# Patient Record
Sex: FEMALE | Race: WHITE | NOT HISPANIC OR LATINO | URBAN - NONMETROPOLITAN AREA
[De-identification: names, ages, dates, MRNs, and addresses within clinical notes are randomized per-mention and may not be internally consistent; named-entity substitution may affect disease eponyms.]

---

## 2019-09-04 ENCOUNTER — IMPORTED ENCOUNTER (OUTPATIENT)
Dept: URBAN - NONMETROPOLITAN AREA CLINIC 1 | Facility: CLINIC | Age: 64
End: 2019-09-04

## 2019-09-04 PROBLEM — H25.13: Noted: 2019-09-04

## 2019-09-04 PROBLEM — H52.13: Noted: 2019-09-04

## 2019-09-04 PROCEDURE — S0620 ROUTINE OPHTHALMOLOGICAL EXA: HCPCS

## 2021-07-05 ENCOUNTER — IMPORTED ENCOUNTER (OUTPATIENT)
Dept: URBAN - NONMETROPOLITAN AREA CLINIC 1 | Facility: CLINIC | Age: 66
End: 2021-07-05

## 2021-07-05 PROBLEM — H25.813: Noted: 2021-07-05

## 2021-07-05 PROBLEM — H52.13: Noted: 2021-07-05

## 2021-07-05 PROBLEM — H43.21: Noted: 2021-07-05

## 2021-07-05 PROCEDURE — 92015 DETERMINE REFRACTIVE STATE: CPT

## 2021-07-05 PROCEDURE — 92014 COMPRE OPH EXAM EST PT 1/>: CPT

## 2021-07-05 NOTE — PATIENT DISCUSSION
Cataract OU -Reviewed symptoms of advancing cataract growth such as glare and halos and decreased vision.-Continue to monitor for now. Pt will notify us if any new symptoms develop. FloatersDiscussed  signs/symptoms of RD or tear. Discussed in detail the nature of flashes/floaters and to call if there is a sudden increase in their number. Myopia-Discussed diagnosis with patient. -Explained that people who are myopic are at a higher risk for developing RD/RT and reviewed associated S&S.-Pt to contact our office if symptoms develop. Updated spec Rx given. Recommend lens that will provide comfort as well as protect safety and health of eyes. -will call back for computer glasses

## 2021-07-20 ENCOUNTER — IMPORTED ENCOUNTER (OUTPATIENT)
Dept: URBAN - NONMETROPOLITAN AREA CLINIC 1 | Facility: CLINIC | Age: 66
End: 2021-07-20

## 2021-07-20 NOTE — PATIENT DISCUSSION
Myopia/Astigmatism/Presbyopia-Rx adjusted both in cylinder and sphere to see if ghosting improves-If NI consider cataract surgical evaluationCataract OU -Reviewed symptoms of advancing cataract growth such as glare and halos and decreased vision.-Continue to monitor for now. Pt will notify us if any new symptoms develop. FloatersDiscussed  signs/symptoms of RD or tear. Discussed in detail the nature of flashes/floaters and to call if there is a sudden increase in their number.

## 2022-04-09 ASSESSMENT — VISUAL ACUITY
OD_SC: 20/20
OS_SC: 20/25
OU_CC: 20/20
OS_SC: 20/25
OD_SC: 20/30
OS_SC: 20/30
OD_CC: J1
OS_CC: J1
OD_SC: 20/30-2

## 2022-04-09 ASSESSMENT — TONOMETRY
OD_IOP_MMHG: 16
OD_IOP_MMHG: 16
OS_IOP_MMHG: 16
OS_IOP_MMHG: 16

## 2024-03-18 ENCOUNTER — ESTABLISHED PATIENT (OUTPATIENT)
Dept: RURAL CLINIC 2 | Facility: CLINIC | Age: 69
End: 2024-03-18

## 2024-03-18 DIAGNOSIS — H43.21: ICD-10-CM

## 2024-03-18 DIAGNOSIS — H25.813: ICD-10-CM

## 2024-03-18 DIAGNOSIS — H10.023: ICD-10-CM

## 2024-03-18 PROCEDURE — 99214 OFFICE O/P EST MOD 30 MIN: CPT

## 2024-03-18 ASSESSMENT — VISUAL ACUITY
OD_CC: 20/50
OS_CC: 20/30

## 2024-03-18 ASSESSMENT — TONOMETRY
OS_IOP_MMHG: 16
OD_IOP_MMHG: 16

## 2024-04-08 ENCOUNTER — CONSULTATION/EVALUATION (OUTPATIENT)
Dept: RURAL CLINIC 1 | Facility: CLINIC | Age: 69
End: 2024-04-08

## 2024-04-08 DIAGNOSIS — H25.813: ICD-10-CM

## 2024-04-08 PROCEDURE — 99214 OFFICE O/P EST MOD 30 MIN: CPT

## 2024-04-08 PROCEDURE — 92134 CPTRZ OPH DX IMG PST SGM RTA: CPT | Mod: NC

## 2024-04-08 PROCEDURE — 92136 OPHTHALMIC BIOMETRY: CPT

## 2024-04-08 PROCEDURE — 92025 CPTRIZED CORNEAL TOPOGRAPHY: CPT | Mod: NC

## 2024-04-08 ASSESSMENT — VISUAL ACUITY
OS_CC: 20/30 BLURRY
OS_AM: 20/25
OS_CC: 20/25
OD_PAM: 20/30
OD_CC: 20/40
OS_SC: 20/200
OD_CC: 20/30-1
OD_SC: 20/400

## 2024-04-08 ASSESSMENT — TONOMETRY
OS_IOP_MMHG: 18
OD_IOP_MMHG: 18

## 2024-04-15 ENCOUNTER — PRE-OP/H&P (OUTPATIENT)
Dept: RURAL CLINIC 1 | Facility: CLINIC | Age: 69
End: 2024-04-15

## 2024-04-15 VITALS
HEIGHT: 62 IN | WEIGHT: 170 LBS | HEART RATE: 83 BPM | SYSTOLIC BLOOD PRESSURE: 122 MMHG | BODY MASS INDEX: 31.28 KG/M2 | DIASTOLIC BLOOD PRESSURE: 76 MMHG

## 2024-04-15 DIAGNOSIS — Z01.818: ICD-10-CM

## 2024-04-15 PROCEDURE — 99499 UNLISTED E&M SERVICE: CPT

## 2024-04-15 ASSESSMENT — KERATOMETRY
OS_AXISANGLE_DEGREES: 87
OD_K1POWER_DIOPTERS: 48.00
OS_AXISANGLE2_DEGREES: 177
OS_K2POWER_DIOPTERS: 46.00
OD_AXISANGLE_DEGREES: 97
OD_AXISANGLE2_DEGREES: 007
OD_K2POWER_DIOPTERS: 46.00
OS_K1POWER_DIOPTERS: 48.00

## 2024-04-30 ENCOUNTER — SURGERY/PROCEDURE (OUTPATIENT)
Dept: URBAN - METROPOLITAN AREA SURGERY 3 | Facility: SURGERY | Age: 69
End: 2024-04-30

## 2024-04-30 DIAGNOSIS — H25.811: ICD-10-CM

## 2024-04-30 PROCEDURE — 68841 INSJ RX ELUT IMPLT LAC CANAL: CPT | Mod: 51,RT

## 2024-04-30 PROCEDURE — 66984 XCAPSL CTRC RMVL W/O ECP: CPT

## 2024-04-30 ASSESSMENT — KERATOMETRY
OS_AXISANGLE2_DEGREES: 177
OD_K1POWER_DIOPTERS: 48.00
OS_AXISANGLE_DEGREES: 87
OS_K2POWER_DIOPTERS: 46.00
OD_K2POWER_DIOPTERS: 46.00
OS_K1POWER_DIOPTERS: 48.00
OD_AXISANGLE2_DEGREES: 007
OD_AXISANGLE_DEGREES: 97

## 2024-05-01 ENCOUNTER — POST-OP (OUTPATIENT)
Dept: RURAL CLINIC 1 | Facility: CLINIC | Age: 69
End: 2024-05-01

## 2024-05-01 DIAGNOSIS — Z96.1: ICD-10-CM

## 2024-05-01 PROCEDURE — 99024 POSTOP FOLLOW-UP VISIT: CPT

## 2024-05-01 ASSESSMENT — TONOMETRY: OD_IOP_MMHG: 20

## 2024-05-01 ASSESSMENT — VISUAL ACUITY: OD_SC: 20/60-1

## 2024-05-08 ENCOUNTER — POST OP/EVAL OF SECOND EYE (OUTPATIENT)
Dept: RURAL CLINIC 1 | Facility: CLINIC | Age: 69
End: 2024-05-08

## 2024-05-08 DIAGNOSIS — H25.812: ICD-10-CM

## 2024-05-08 PROCEDURE — 99213 OFFICE O/P EST LOW 20 MIN: CPT | Mod: 24

## 2024-05-08 ASSESSMENT — VISUAL ACUITY
OS_SC: 20/400
OD_SC: 20/40

## 2024-05-08 ASSESSMENT — TONOMETRY
OS_IOP_MMHG: 17
OD_IOP_MMHG: 17

## 2024-05-14 ENCOUNTER — SURGERY/PROCEDURE (OUTPATIENT)
Dept: URBAN - METROPOLITAN AREA SURGERY 3 | Facility: SURGERY | Age: 69
End: 2024-05-14

## 2024-05-14 DIAGNOSIS — H25.812: ICD-10-CM

## 2024-05-14 PROCEDURE — 66984 XCAPSL CTRC RMVL W/O ECP: CPT | Mod: 79,LT

## 2024-05-14 PROCEDURE — 68841 INSJ RX ELUT IMPLT LAC CANAL: CPT | Mod: 51,LT,79,LT

## 2024-05-15 ENCOUNTER — POST-OP (OUTPATIENT)
Dept: RURAL CLINIC 1 | Facility: CLINIC | Age: 69
End: 2024-05-15

## 2024-05-15 DIAGNOSIS — Z96.1: ICD-10-CM

## 2024-05-15 PROCEDURE — 99024 POSTOP FOLLOW-UP VISIT: CPT

## 2024-05-15 ASSESSMENT — VISUAL ACUITY
OU_SC: 20/30-1
OS_SC: 20/40-1 BLURRY
OD_SC: 20/40-1

## 2024-05-15 ASSESSMENT — TONOMETRY
OD_IOP_MMHG: 15
OS_IOP_MMHG: 21

## 2024-05-22 ENCOUNTER — POST-OP (OUTPATIENT)
Dept: RURAL CLINIC 1 | Facility: CLINIC | Age: 69
End: 2024-05-22

## 2024-05-22 DIAGNOSIS — Z96.1: ICD-10-CM

## 2024-05-22 PROCEDURE — 99024 POSTOP FOLLOW-UP VISIT: CPT

## 2024-05-22 ASSESSMENT — VISUAL ACUITY
OD_SC: 20/40+1
OS_SC: 20/40-1

## 2024-05-22 ASSESSMENT — TONOMETRY
OS_IOP_MMHG: 13
OD_IOP_MMHG: 12

## 2024-09-12 ENCOUNTER — FOLLOW UP (OUTPATIENT)
Dept: RURAL CLINIC 1 | Facility: CLINIC | Age: 69
End: 2024-09-12

## 2024-09-12 DIAGNOSIS — Z96.1: ICD-10-CM

## 2024-09-12 DIAGNOSIS — H26.493: ICD-10-CM

## 2024-09-12 DIAGNOSIS — H43.21: ICD-10-CM

## 2024-09-12 PROCEDURE — 92014 COMPRE OPH EXAM EST PT 1/>: CPT
